# Patient Record
Sex: MALE | Race: WHITE | Employment: UNEMPLOYED | URBAN - METROPOLITAN AREA
[De-identification: names, ages, dates, MRNs, and addresses within clinical notes are randomized per-mention and may not be internally consistent; named-entity substitution may affect disease eponyms.]

---

## 2020-08-25 ENCOUNTER — APPOINTMENT (OUTPATIENT)
Dept: GENERAL RADIOLOGY | Age: 50
End: 2020-08-25
Payer: MEDICARE

## 2020-08-25 ENCOUNTER — HOSPITAL ENCOUNTER (EMERGENCY)
Age: 50
Discharge: HOME | End: 2020-08-25
Attending: EMERGENCY MEDICINE
Payer: MEDICARE

## 2020-08-25 VITALS
OXYGEN SATURATION: 97 % | TEMPERATURE: 98.4 F | RESPIRATION RATE: 18 BRPM | BODY MASS INDEX: 39.24 KG/M2 | WEIGHT: 250 LBS | DIASTOLIC BLOOD PRESSURE: 85 MMHG | HEIGHT: 67 IN | SYSTOLIC BLOOD PRESSURE: 128 MMHG | HEART RATE: 65 BPM

## 2020-08-25 PROCEDURE — 73610 X-RAY EXAM OF ANKLE: CPT

## 2020-08-25 PROCEDURE — 93971 EXTREMITY STUDY: CPT

## 2020-08-25 PROCEDURE — 99283 EMERGENCY DEPT VISIT LOW MDM: CPT

## 2020-08-25 PROCEDURE — 29515 APPLICATION SHORT LEG SPLINT: CPT

## 2020-08-25 PROCEDURE — 6370000000 HC RX 637 (ALT 250 FOR IP): Performed by: PHYSICIAN ASSISTANT

## 2020-08-25 RX ORDER — OXYCODONE HYDROCHLORIDE 5 MG/1
5 TABLET ORAL ONCE
Status: COMPLETED | OUTPATIENT
Start: 2020-08-25 | End: 2020-08-25

## 2020-08-25 RX ORDER — OXYCODONE HYDROCHLORIDE 5 MG/1
5 TABLET ORAL EVERY 6 HOURS PRN
Qty: 12 TABLET | Refills: 0 | Status: SHIPPED | OUTPATIENT
Start: 2020-08-25 | End: 2020-08-28

## 2020-08-25 RX ADMIN — OXYCODONE 5 MG: 5 TABLET ORAL at 15:24

## 2020-08-25 ASSESSMENT — PAIN DESCRIPTION - LOCATION: LOCATION: FOOT;LEG

## 2020-08-25 ASSESSMENT — PAIN SCALES - GENERAL
PAINLEVEL_OUTOF10: 7
PAINLEVEL_OUTOF10: 7

## 2020-08-25 ASSESSMENT — PAIN DESCRIPTION - ORIENTATION: ORIENTATION: LEFT

## 2020-08-25 ASSESSMENT — ENCOUNTER SYMPTOMS
VOMITING: 0
CONSTIPATION: 0
COUGH: 0
BACK PAIN: 0
ABDOMINAL PAIN: 0
DIARRHEA: 0
CHEST TIGHTNESS: 0
NAUSEA: 0
SHORTNESS OF BREATH: 0

## 2020-08-25 ASSESSMENT — PAIN DESCRIPTION - PAIN TYPE: TYPE: ACUTE PAIN

## 2020-08-25 NOTE — ED NOTES
Pt Discharged in stable condition, VSS, no signs of distress, discharge instructions and meds reviewed. Pt verbalizes understanding and states no further questions or concerns unaddressed. Pt taken to family car via wheelchair.        Lexy Finley RN  08/25/20 7712
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
## 2020-08-25 NOTE — ED PROVIDER NOTES
I independently performed a history and physical on Ivonne Mix. All diagnostic, treatment, and disposition decisions were made by myself in conjunction with the advanced practice provider. I have participated in the medical decision making and directed the treatment plan and disposition of the patient. For further details of Juliana Rivas emergency department encounter, please see the advanced practice provider's documentation. CHIEF COMPLAINT  Chief Complaint   Patient presents with    Foot Pain     Pt. comes in today with complaints of left foot pain. Pt. report that he is unable to bear weight on his left foot. Pt. reports that it started yesterday and his foot/leg started swelling this morning. Pt. concerned that it maybe his achiles. Briefly, Ivonne Mix is a 45 y.o. male  who presents to the ED complaining of left foot and ankle pain. He says sometimes it hurts mostly in the Achilles but also anterolateral ankle, less so to the medial ankle. He denies any specific injury that he can recall. He says it is possible he twisted it but certainly nothing notable to him. Onset of symptoms was yesterday and progressive since today with pain with weightbearing. Denies any other pain in the left lower extremity. He is on Coumadin. No symptoms in the right lower extremity. No other complaints. FOCUSED PHYSICAL EXAMINATION  /85   Pulse 74   Temp 98.4 °F (36.9 °C) (Oral)   Resp 18   Ht 5' 7\" (1.702 m)   Wt 250 lb (113.4 kg)   SpO2 98%   BMI 39.16 kg/m²    Focused physical examination notable for no acute distress, well-appearing, well-nourished, normal speech and mentation without obvious facial droop, no obvious rash. No obvious cranial nerve deficits on my initial exam.   MUSCULOSKELETAL:  RLE: No tenderness. 2+ DP and PT. Sensation and motor function fully intact. Full range of motion of all major joints. No erythema, bruising, or lacerations.   Compartments are soft.  2+ patellar reflex. Achilles nontender and intact. Able to bear weight. No joint swelling or effusions are noted. LLE: Tenderness over the left dorsal, lateral and posterior ankle, with no gap or deformity to the Achilles tendon, minimal medial ankle tenderness. There is no significant distal foot tenderness. There is no calf, shin, knee or proximal left lower extremity tenderness. Mild swelling diffusely at the ankle noted. Mostly laterally. 2+ DP and PT. Sensation and motor function fully intact. Full range of motion of all major joints. No erythema, bruising, or lacerations. Compartments are soft. 2+ patellar reflex. Pain with weighbearing. No joint swelling or effusions are noted. MDM:  Diagnostic considerations included DVT, dependent peripheral edema, CHF, lymphedema, venous stasis, cellulitis, abscess, trauma    ED course was notable for superficial venous thrombosis noted on venous Doppler ultrasound. No DVT. Patient is also on Coumadin already. No indication for emergent INR check given lack of DVT. From a traumatic standpoint, although there is no definitive history of trauma, a subtle nondisplaced left distal fibular fracture is noted and could be consistent with his presentation. As such he will be put in a splint and follow-up with orthopedics, crutches, nonweightbearing until follow-up, with PRN oxycodone for home. Under my direction a short leg + sugar tong OCL splint was placed on the LLE by the ED tech. I have examined the splint thoroughly for functionality. Extremity is distally neurovascularly intact with movement of digits, normal sensation and brisk cap refill. We discussed splint precautions including development of worsening swelling, pain, numbness, tingling, or weakness.       During the patient's ED course, the patient was given:  Medications   oxyCODONE (ROXICODONE) immediate release tablet 5 mg (5 mg Oral Given 8/25/20 1524)        CLINICAL IMPRESSION  1. Closed avulsion fracture of distal end of left fibula, initial encounter    2. Chronic superficial venous thrombosis of lower extremity, left        DISPOSITION  Latonya Degroot was discharged to home in stable condition. I have discussed the findings of today's workup with the patient and addressed the patient's questions and concerns. Important warning signs as well as new or worsening symptoms which would necessitate immediate return to the ED were discussed. The plan is to discharge from the ED at this time, and the patient is in stable condition. The patient acknowledged understanding is agreeable with this plan. Patient was given scripts for the following medications. I counseled patient how to take these medications. New Prescriptions    OXYCODONE (ROXICODONE) 5 MG IMMEDIATE RELEASE TABLET    Take 1 tablet by mouth every 6 hours as needed for Pain for up to 3 days. Intended supply: 3 days. Take lowest dose possible to manage pain       Follow-up with:  Byron London MD  [address] [place] [address], [id number]  [facility]  [phone]    Schedule an appointment as soon as possible for a visit in 3 days  For symptom re-evaluation    [facility] Emergency Department  [address] [place] [address]  [phone]  Go to   If symptoms worsen      This chart was created using Dragon dictation software. Efforts were made by me to ensure accuracy, however some errors may be present due to limitations of this technology.     ,     Sisi Long MD  08/25/20 6783

## 2020-08-28 ENCOUNTER — OFFICE VISIT (OUTPATIENT)
Dept: ORTHOPEDIC SURGERY | Age: 50
End: 2020-08-28
Payer: MEDICARE

## 2020-08-28 ENCOUNTER — TELEPHONE (OUTPATIENT)
Dept: ORTHOPEDIC SURGERY | Age: 50
End: 2020-08-28

## 2020-08-28 VITALS — HEIGHT: 67 IN | WEIGHT: 265 LBS | BODY MASS INDEX: 41.59 KG/M2

## 2020-08-28 PROCEDURE — G8427 DOCREV CUR MEDS BY ELIG CLIN: HCPCS | Performed by: ORTHOPAEDIC SURGERY

## 2020-08-28 PROCEDURE — 99203 OFFICE O/P NEW LOW 30 MIN: CPT | Performed by: ORTHOPAEDIC SURGERY

## 2020-08-28 PROCEDURE — 1036F TOBACCO NON-USER: CPT | Performed by: ORTHOPAEDIC SURGERY

## 2020-08-28 PROCEDURE — 3017F COLORECTAL CA SCREEN DOC REV: CPT | Performed by: ORTHOPAEDIC SURGERY

## 2020-08-28 PROCEDURE — G8417 CALC BMI ABV UP PARAM F/U: HCPCS | Performed by: ORTHOPAEDIC SURGERY

## 2020-08-28 RX ORDER — LOSARTAN POTASSIUM 50 MG/1
TABLET ORAL
COMMUNITY
Start: 2020-08-22

## 2020-08-28 RX ORDER — METOPROLOL TARTRATE 50 MG/1
TABLET, FILM COATED ORAL
COMMUNITY
Start: 2020-08-12

## 2020-08-28 NOTE — TELEPHONE ENCOUNTER
8/28/20 [facility]    NO PRECERT REQUIRED - PATIENT HAS MEDICARE AND E-VERIFIED  -  PER Epic -  MP

## 2020-08-28 NOTE — PROGRESS NOTES
Standing Status:   Future     Number of Occurrences:   1     Standing Expiration Date:   8/28/2021     Order Specific Question:   Reason for exam:     Answer: Ankle Pain       Treatment Plan:    I have discussed treatment options with the patient. I do not believe that his x-rays show a fracture at this time. He likely has a lateral ankle sprain. We will give him a Breg Boot, for when he is up and moving around. Once his pain starts to subside, he should start weaning out of the boot. Recommended that he start using OTC Voltaren gel on the area of pain, as well as ice. He should start with gentle ankle exercises to help his ankle not to become stiff. He will follow up in 3 weeks, if needed. If he has not made any progress by that point in time, we will proceed with an MRI. While gout remains in our differential due to his history, we will see how he responds to our current treatment. He understands and accepts this course of care. By signing my name below, Shasha Sarabia, attest that this documentation has been prepared under the direction and in the presence of Ramiro Harrell MD.   Electronically Signed: Mihir Fuentes, 8/28/20, 3:37 PM EDT. Darling Galvan MD, personally performed the services described in this documentation. All medical record entries made by the yueibe were at my direction and in my presence. I have reviewed the chart and discharge instructions (if applicable) and agree that the record reflects my personal performance and is accurate and complete. Ramiro Harrell MD, 9/7/20, 8:55 PM EDT. Documentation was done using voice recognition dragon software. Every effort was made to ensure accuracy; however, inadvertent  Unintentional computerized transcription errors may be present.

## 2022-04-30 ENCOUNTER — TELEPHONE ENCOUNTER (OUTPATIENT)
Dept: URBAN - METROPOLITAN AREA CLINIC 121 | Facility: CLINIC | Age: 52
End: 2022-04-30

## 2022-05-01 ENCOUNTER — TELEPHONE ENCOUNTER (OUTPATIENT)
Dept: URBAN - METROPOLITAN AREA CLINIC 121 | Facility: CLINIC | Age: 52
End: 2022-05-01